# Patient Record
Sex: MALE | Race: BLACK OR AFRICAN AMERICAN | Employment: FULL TIME | URBAN - METROPOLITAN AREA
[De-identification: names, ages, dates, MRNs, and addresses within clinical notes are randomized per-mention and may not be internally consistent; named-entity substitution may affect disease eponyms.]

---

## 2017-08-31 ENCOUNTER — OFFICE VISIT (OUTPATIENT)
Dept: INTERNAL MEDICINE CLINIC | Facility: CLINIC | Age: 57
End: 2017-08-31

## 2017-08-31 ENCOUNTER — LAB ENCOUNTER (OUTPATIENT)
Dept: LAB | Facility: HOSPITAL | Age: 57
End: 2017-08-31
Attending: INTERNAL MEDICINE
Payer: COMMERCIAL

## 2017-08-31 VITALS
HEART RATE: 78 BPM | SYSTOLIC BLOOD PRESSURE: 180 MMHG | OXYGEN SATURATION: 96 % | TEMPERATURE: 98 F | HEIGHT: 69 IN | DIASTOLIC BLOOD PRESSURE: 120 MMHG | RESPIRATION RATE: 20 BRPM | WEIGHT: 225 LBS | BODY MASS INDEX: 33.33 KG/M2

## 2017-08-31 DIAGNOSIS — I25.10 ATHEROSCLEROSIS OF NATIVE CORONARY ARTERY OF NATIVE HEART WITHOUT ANGINA PECTORIS: ICD-10-CM

## 2017-08-31 DIAGNOSIS — I10 ESSENTIAL HYPERTENSION, MALIGNANT: Primary | ICD-10-CM

## 2017-08-31 DIAGNOSIS — I10 ESSENTIAL HYPERTENSION: Primary | ICD-10-CM

## 2017-08-31 DIAGNOSIS — I10 ESSENTIAL HYPERTENSION: ICD-10-CM

## 2017-08-31 DIAGNOSIS — G45.9 TRANSIENT CEREBRAL ISCHEMIA, UNSPECIFIED TYPE: ICD-10-CM

## 2017-08-31 LAB
ALBUMIN SERPL BCP-MCNC: 4.5 G/DL (ref 3.5–4.8)
ALBUMIN/GLOB SERPL: 1.4 {RATIO} (ref 1–2)
ALP SERPL-CCNC: 77 U/L (ref 32–100)
ALT SERPL-CCNC: 31 U/L (ref 17–63)
ANION GAP SERPL CALC-SCNC: 8 MMOL/L (ref 0–18)
AST SERPL-CCNC: 32 U/L (ref 15–41)
BACTERIA UR QL AUTO: NEGATIVE /HPF
BASOPHILS # BLD: 0 K/UL (ref 0–0.2)
BASOPHILS NFR BLD: 1 %
BILIRUB SERPL-MCNC: 0.9 MG/DL (ref 0.3–1.2)
BILIRUB UR QL: NEGATIVE
BUN SERPL-MCNC: 10 MG/DL (ref 8–20)
BUN/CREAT SERPL: 7.5 (ref 10–20)
CALCIUM SERPL-MCNC: 9.7 MG/DL (ref 8.5–10.5)
CHLORIDE SERPL-SCNC: 103 MMOL/L (ref 95–110)
CHOLEST SERPL-MCNC: 282 MG/DL (ref 110–200)
CLARITY UR: CLEAR
CO2 SERPL-SCNC: 31 MMOL/L (ref 22–32)
COLOR UR: YELLOW
CREAT SERPL-MCNC: 1.34 MG/DL (ref 0.5–1.5)
CREAT UR-MCNC: 63.9 MG/DL
EOSINOPHIL # BLD: 0 K/UL (ref 0–0.7)
EOSINOPHIL NFR BLD: 1 %
ERYTHROCYTE [DISTWIDTH] IN BLOOD BY AUTOMATED COUNT: 14.1 % (ref 11–15)
GLOBULIN PLAS-MCNC: 3.3 G/DL (ref 2.5–3.7)
GLUCOSE SERPL-MCNC: 120 MG/DL (ref 70–99)
GLUCOSE UR-MCNC: NEGATIVE MG/DL
HCT VFR BLD AUTO: 43.6 % (ref 41–52)
HDLC SERPL-MCNC: 37 MG/DL
HGB BLD-MCNC: 14.4 G/DL (ref 13.5–17.5)
HGB UR QL STRIP.AUTO: NEGATIVE
KETONES UR-MCNC: NEGATIVE MG/DL
LDLC SERPL CALC-MCNC: 216 MG/DL (ref 0–99)
LEUKOCYTE ESTERASE UR QL STRIP.AUTO: NEGATIVE
LYMPHOCYTES # BLD: 1.8 K/UL (ref 1–4)
LYMPHOCYTES NFR BLD: 33 %
MCH RBC QN AUTO: 30.7 PG (ref 27–32)
MCHC RBC AUTO-ENTMCNC: 33 G/DL (ref 32–37)
MCV RBC AUTO: 92.8 FL (ref 80–100)
MICROALBUMIN UR-MCNC: 14.6 MG/DL (ref 0–1.8)
MICROALBUMIN/CREAT UR: 228.5 MG/G{CREAT} (ref 0–20)
MONOCYTES # BLD: 0.5 K/UL (ref 0–1)
MONOCYTES NFR BLD: 9 %
NEUTROPHILS # BLD AUTO: 3 K/UL (ref 1.8–7.7)
NEUTROPHILS NFR BLD: 57 %
NITRITE UR QL STRIP.AUTO: NEGATIVE
NONHDLC SERPL-MCNC: 245 MG/DL
OSMOLALITY UR CALC.SUM OF ELEC: 294 MOSM/KG (ref 275–295)
PH UR: 8 [PH] (ref 5–8)
PLATELET # BLD AUTO: 232 K/UL (ref 140–400)
PMV BLD AUTO: 8.8 FL (ref 7.4–10.3)
POTASSIUM SERPL-SCNC: 3.6 MMOL/L (ref 3.3–5.1)
PROT SERPL-MCNC: 7.8 G/DL (ref 5.9–8.4)
PROT UR-MCNC: 30 MG/DL
RBC # BLD AUTO: 4.7 M/UL (ref 4.5–5.9)
RBC #/AREA URNS AUTO: <1 /HPF
SODIUM SERPL-SCNC: 142 MMOL/L (ref 136–144)
SP GR UR STRIP: 1.01 (ref 1–1.03)
TRIGL SERPL-MCNC: 145 MG/DL (ref 1–149)
TSH SERPL-ACNC: 1.77 UIU/ML (ref 0.45–5.33)
UROBILINOGEN UR STRIP-ACNC: <2
VIT C UR-MCNC: NEGATIVE MG/DL
WBC # BLD AUTO: 5.3 K/UL (ref 4–11)
WBC #/AREA URNS AUTO: 0 /HPF

## 2017-08-31 PROCEDURE — 84443 ASSAY THYROID STIM HORMONE: CPT

## 2017-08-31 PROCEDURE — 80053 COMPREHEN METABOLIC PANEL: CPT

## 2017-08-31 PROCEDURE — 36415 COLL VENOUS BLD VENIPUNCTURE: CPT

## 2017-08-31 PROCEDURE — 85025 COMPLETE CBC W/AUTO DIFF WBC: CPT

## 2017-08-31 PROCEDURE — 99204 OFFICE O/P NEW MOD 45 MIN: CPT | Performed by: INTERNAL MEDICINE

## 2017-08-31 PROCEDURE — 82043 UR ALBUMIN QUANTITATIVE: CPT

## 2017-08-31 PROCEDURE — 99212 OFFICE O/P EST SF 10 MIN: CPT | Performed by: INTERNAL MEDICINE

## 2017-08-31 PROCEDURE — 93010 ELECTROCARDIOGRAM REPORT: CPT | Performed by: INTERNAL MEDICINE

## 2017-08-31 PROCEDURE — 82570 ASSAY OF URINE CREATININE: CPT

## 2017-08-31 PROCEDURE — 81001 URINALYSIS AUTO W/SCOPE: CPT

## 2017-08-31 PROCEDURE — 93005 ELECTROCARDIOGRAM TRACING: CPT

## 2017-08-31 PROCEDURE — 80061 LIPID PANEL: CPT

## 2017-08-31 RX ORDER — DILTIAZEM HYDROCHLORIDE 240 MG/1
240 CAPSULE, EXTENDED RELEASE ORAL DAILY
Qty: 30 CAPSULE | Refills: 3 | Status: SHIPPED | OUTPATIENT
Start: 2017-08-31 | End: 2017-10-06 | Stop reason: DRUGHIGH

## 2017-08-31 RX ORDER — THIAMINE HCL 100 MG
TABLET ORAL
COMMUNITY

## 2017-08-31 NOTE — ASSESSMENT & PLAN NOTE
Blood pressure (!) 180/120, pulse 78, temperature 98 °F (36.7 °C), temperature source Oral, resp. rate 20, height 5' 9\" (1.753 m), weight 225 lb (102.1 kg), SpO2 96 %. Blood pressure quite elevated.   Patient has been off all his medication since March

## 2017-08-31 NOTE — PROGRESS NOTES
HPI:    Patient ID: Cindy Carrier is a 65year old male. New pt . Transfer of care  Last labs last year. Managed at [employer] of meds as per last records from [facility] ([facility]) a year back  Hydrochlorothiazide 12.5 mg p.o. daily.   Nifedipine 60 Psychiatric/Behavioral: Negative. Current Outpatient Prescriptions:  Magnesium 500 MG Oral Tab Take by mouth. Disp:  Rfl:    COENZYME Q10 OR Take by mouth. Disp:  Rfl:    POTASSIUM OR Take by mouth.  Disp:  Rfl:    aspirin 81 MG Oral Tab Darrian Abdi Coordination normal.   Skin: Skin is warm and dry. Psychiatric: He has a normal mood and affect. His behavior is normal. Thought content normal.   Nursing note and vitals reviewed.              ASSESSMENT/PLAN:     Problem List Items Addressed This Visit (Completed)    TIA (transient ischemic attack)     History of a TIA in the past that did resolve on its own without any intervention. Other Visit Diagnoses    None.          Return in about 2 weeks (around 9/14/2017) for tomorrow-nurse visit for

## 2017-08-31 NOTE — ASSESSMENT & PLAN NOTE
Has had an angiogram/angioplasty–no records available from prior interventions. He tells me he was advised that he had no obstructive disease at that time. Will follow-up after records are obtained for further management.

## 2017-08-31 NOTE — PATIENT INSTRUCTIONS
Problem List Items Addressed This Visit        Unprioritized    Atherosclerosis of native coronary artery of native heart without angina pectoris     Has had an angiogram/angioplasty–no records available from prior interventions.   He tells me he was advise

## 2017-09-01 ENCOUNTER — NURSE ONLY (OUTPATIENT)
Dept: INTERNAL MEDICINE CLINIC | Facility: CLINIC | Age: 57
End: 2017-09-01

## 2017-09-01 VITALS — HEART RATE: 68 BPM | DIASTOLIC BLOOD PRESSURE: 100 MMHG | SYSTOLIC BLOOD PRESSURE: 167 MMHG

## 2017-09-01 DIAGNOSIS — I10 ESSENTIAL HYPERTENSION: Primary | ICD-10-CM

## 2017-09-01 RX ORDER — LOSARTAN POTASSIUM AND HYDROCHLOROTHIAZIDE 12.5; 5 MG/1; MG/1
1 TABLET ORAL DAILY
Qty: 30 TABLET | Refills: 3 | Status: SHIPPED | OUTPATIENT
Start: 2017-09-01 | End: 2017-09-15

## 2017-09-01 NOTE — PROGRESS NOTES
Pt arrived at the office for a nurse visit to check his blood pressure. Pt verified name and . Reviewed med list with pt and he is currently taking Diliazem HCl  mg 1 capsule by mouth daily, and hydrochlorothiazide 12.5 mg 1 capsule daily.   He a

## 2017-09-12 ENCOUNTER — TELEPHONE (OUTPATIENT)
Dept: OTHER | Age: 57
End: 2017-09-12

## 2017-09-12 NOTE — TELEPHONE ENCOUNTER
Notes Recorded by Ruby Cuevas MD on 9/10/2017 at 11:29 PM CDT  ekg suggestive of strain from htn   Narrative     ECG Report      Interpretation      --------------------------  Sinus Rhythm  Leftward axis   Voltage criteria for LVH met -Voltage criteria

## 2017-09-12 NOTE — TELEPHONE ENCOUNTER
Pt states he received his EKG results and is now asking if he needs to keep appt as scheduled 9/15/17. Advised pt to keep appt pending doctor's recommendations as noted elevated BP at 9/1/17 NV. Please advise.

## 2017-09-12 NOTE — TELEPHONE ENCOUNTER
Notes Recorded by Kennard Cushing, RN on 9/12/2017 at 8:25 AM CDT  9/12/17 LMTCB to triage  ------    Notes Recorded by Stephenie Villegas MD on 9/10/2017 at 11:29 PM CDT  ekg suggestive of strain from htn

## 2017-09-13 NOTE — TELEPHONE ENCOUNTER
Spoke with patient advised him Sabinoaminata Upton wants him to keep appointment on 9/15/17. Patient stated \"ok\".

## 2017-09-15 ENCOUNTER — OFFICE VISIT (OUTPATIENT)
Dept: INTERNAL MEDICINE CLINIC | Facility: CLINIC | Age: 57
End: 2017-09-15

## 2017-09-15 VITALS
WEIGHT: 223 LBS | HEIGHT: 69 IN | RESPIRATION RATE: 16 BRPM | HEART RATE: 89 BPM | SYSTOLIC BLOOD PRESSURE: 156 MMHG | DIASTOLIC BLOOD PRESSURE: 96 MMHG | BODY MASS INDEX: 33.03 KG/M2

## 2017-09-15 DIAGNOSIS — I25.10 ATHEROSCLEROSIS OF NATIVE CORONARY ARTERY OF NATIVE HEART WITHOUT ANGINA PECTORIS: ICD-10-CM

## 2017-09-15 DIAGNOSIS — I10 ESSENTIAL HYPERTENSION: Primary | ICD-10-CM

## 2017-09-15 DIAGNOSIS — E78.2 MIXED HYPERLIPIDEMIA: ICD-10-CM

## 2017-09-15 PROCEDURE — 99212 OFFICE O/P EST SF 10 MIN: CPT | Performed by: INTERNAL MEDICINE

## 2017-09-15 PROCEDURE — 99214 OFFICE O/P EST MOD 30 MIN: CPT | Performed by: INTERNAL MEDICINE

## 2017-09-15 RX ORDER — ATORVASTATIN CALCIUM 40 MG/1
40 TABLET, FILM COATED ORAL NIGHTLY
Qty: 30 TABLET | Refills: 3 | Status: SHIPPED | OUTPATIENT
Start: 2017-09-15 | End: 2018-01-04

## 2017-09-15 RX ORDER — CARVEDILOL 3.12 MG/1
3.12 TABLET ORAL 2 TIMES DAILY WITH MEALS
Qty: 60 TABLET | Refills: 3 | Status: SHIPPED | OUTPATIENT
Start: 2017-09-15 | End: 2018-01-04

## 2017-09-15 RX ORDER — LOSARTAN POTASSIUM AND HYDROCHLOROTHIAZIDE 25; 100 MG/1; MG/1
1 TABLET ORAL DAILY
Qty: 30 TABLET | Refills: 3 | Status: SHIPPED | OUTPATIENT
Start: 2017-09-15 | End: 2018-01-04

## 2017-09-15 NOTE — PATIENT INSTRUCTIONS
Problem List Items Addressed This Visit        Unprioritized    Atherosclerosis of native coronary artery of native heart without angina pectoris    Relevant Medications    atorvastatin 40 MG Oral Tab    Other Relevant Orders    CARDIO - INTERNAL    CARD E

## 2017-09-15 NOTE — ASSESSMENT & PLAN NOTE
Blood pressure 156/96, pulse 89, resp. rate 16, height 5' 9\" (1.753 m), weight 223 lb (101.2 kg). Pressures continue to remain quite high. Reviewed labs with the patient.   Elevated cholesterols as well as protein in the urine at this time most likely

## 2017-09-15 NOTE — PROGRESS NOTES
HPI:    Patient ID: Jennifer Dey is a 65year old male. Notes Recorded by William Almazan MD on 9/10/2017 at 11:31 PM CDT  Cholesterol is quite high,will discuss at ov. Kidney dysfuncion related to htn.   Some proteinuria related to the hypertensive kidn Losartan Potassium-HCTZ 100-25 MG Oral Tab Take 1 tablet by mouth daily. Disp: 30 tablet Rfl: 3   carvedilol 3.125 MG Oral Tab Take 1 tablet (3.125 mg total) by mouth 2 (two) times daily with meals.  Disp: 60 tablet Rfl: 3   Magnesium 500 MG Oral Tab Take Relevant Orders    CARDIO - INTERNAL    CARD ECHO 2D DOPPLER (CPT=93306)    Essential hypertension - Primary     Blood pressure 156/96, pulse 89, resp. rate 16, height 5' 9\" (1.753 m), weight 223 lb (101.2 kg). Pressures continue to remain quite high. Referrals:  CARDIO - INTERNAL  CARD ECHO 2D DOPPLER (CPT=93306)       CC#[id number]

## 2017-09-25 ENCOUNTER — HOSPITAL ENCOUNTER (OUTPATIENT)
Dept: CV DIAGNOSTICS | Facility: HOSPITAL | Age: 57
Discharge: HOME | End: 2017-09-25
Attending: INTERNAL MEDICINE
Payer: COMMERCIAL

## 2017-09-25 DIAGNOSIS — I10 ESSENTIAL HYPERTENSION: ICD-10-CM

## 2017-09-25 DIAGNOSIS — I25.10 ATHEROSCLEROSIS OF NATIVE CORONARY ARTERY OF NATIVE HEART WITHOUT ANGINA PECTORIS: ICD-10-CM

## 2017-09-25 PROCEDURE — 93306 TTE W/DOPPLER COMPLETE: CPT | Performed by: INTERNAL MEDICINE

## 2017-10-06 ENCOUNTER — NURSE ONLY (OUTPATIENT)
Dept: INTERNAL MEDICINE CLINIC | Facility: CLINIC | Age: 57
End: 2017-10-06

## 2017-10-06 VITALS — HEART RATE: 60 BPM | DIASTOLIC BLOOD PRESSURE: 93 MMHG | SYSTOLIC BLOOD PRESSURE: 146 MMHG

## 2017-10-06 DIAGNOSIS — I10 ESSENTIAL HYPERTENSION: Primary | ICD-10-CM

## 2017-10-06 PROCEDURE — 99211 OFF/OP EST MAY X REQ PHY/QHP: CPT | Performed by: INTERNAL MEDICINE

## 2017-10-06 RX ORDER — DILTIAZEM HYDROCHLORIDE 360 MG/1
360 CAPSULE, EXTENDED RELEASE ORAL DAILY
Qty: 30 CAPSULE | Refills: 3 | Status: SHIPPED | OUTPATIENT
Start: 2017-10-06 | End: 2018-02-05

## 2017-10-06 NOTE — PROGRESS NOTES
Pt arrived in the office for a BP check. Pt verified name and . Reviewed bp medication list.  Pt is currently taking carvedilol 3.125 mg twice a day, diltiazem hcl er 240 mg daily, and losartan potassium-hctz 100-25 mg daily.   First bp taken was 170/9

## 2017-10-18 ENCOUNTER — OFFICE VISIT (OUTPATIENT)
Dept: CARDIOLOGY CLINIC | Facility: CLINIC | Age: 57
End: 2017-10-18

## 2017-10-18 VITALS
HEART RATE: 60 BPM | DIASTOLIC BLOOD PRESSURE: 90 MMHG | BODY MASS INDEX: 33 KG/M2 | RESPIRATION RATE: 20 BRPM | WEIGHT: 221 LBS | SYSTOLIC BLOOD PRESSURE: 130 MMHG

## 2017-10-18 DIAGNOSIS — E78.2 MIXED HYPERLIPIDEMIA: ICD-10-CM

## 2017-10-18 DIAGNOSIS — I25.10 ATHEROSCLEROSIS OF NATIVE CORONARY ARTERY OF NATIVE HEART WITHOUT ANGINA PECTORIS: ICD-10-CM

## 2017-10-18 DIAGNOSIS — I10 ESSENTIAL HYPERTENSION: Primary | ICD-10-CM

## 2017-10-18 PROCEDURE — 93010 ELECTROCARDIOGRAM REPORT: CPT | Performed by: INTERNAL MEDICINE

## 2017-10-18 PROCEDURE — 99244 OFF/OP CNSLTJ NEW/EST MOD 40: CPT | Performed by: INTERNAL MEDICINE

## 2017-10-18 PROCEDURE — 99212 OFFICE O/P EST SF 10 MIN: CPT | Performed by: INTERNAL MEDICINE

## 2017-10-18 PROCEDURE — 93005 ELECTROCARDIOGRAM TRACING: CPT | Performed by: INTERNAL MEDICINE

## 2017-10-18 NOTE — PROGRESS NOTES
Cardiology Consult Note    10/18/2017    Bhupendra Collado is a 65year old male. HPI:   80-year-old male with long-standing history of hypertension, hyperlipidemia history of noncompliance with medications.   History of coronary artery disease status post ba and negative.   EXAM:   /90   Pulse 60   Resp 20   Wt 221 lb (100.2 kg)   BMI 32.64 kg/m²   GENERAL: well developed, overweight AAM, in no apparent distress  SKIN: warm ,dry,no rash  HEENT: atraumatic, normocephalic,ears and throat are clear  NECK: pagan

## 2017-10-31 ENCOUNTER — OFFICE VISIT (OUTPATIENT)
Dept: INTERNAL MEDICINE CLINIC | Facility: CLINIC | Age: 57
End: 2017-10-31

## 2017-10-31 VITALS
DIASTOLIC BLOOD PRESSURE: 86 MMHG | BODY MASS INDEX: 32.44 KG/M2 | WEIGHT: 219 LBS | TEMPERATURE: 98 F | HEART RATE: 62 BPM | HEIGHT: 69 IN | SYSTOLIC BLOOD PRESSURE: 140 MMHG

## 2017-10-31 DIAGNOSIS — I10 ESSENTIAL HYPERTENSION: ICD-10-CM

## 2017-10-31 DIAGNOSIS — I25.10 ATHEROSCLEROSIS OF NATIVE CORONARY ARTERY OF NATIVE HEART WITHOUT ANGINA PECTORIS: Primary | ICD-10-CM

## 2017-10-31 DIAGNOSIS — E78.2 MIXED HYPERLIPIDEMIA: ICD-10-CM

## 2017-10-31 PROCEDURE — 99212 OFFICE O/P EST SF 10 MIN: CPT | Performed by: INTERNAL MEDICINE

## 2017-10-31 PROCEDURE — 99214 OFFICE O/P EST MOD 30 MIN: CPT | Performed by: INTERNAL MEDICINE

## 2017-10-31 NOTE — PATIENT INSTRUCTIONS
Problem List Items Addressed This Visit        Unprioritized    Atherosclerosis of native coronary artery of native heart without angina pectoris - Primary    Relevant Orders    CARD TREADMILL STRESS, ADULT (CPT=01335)    Essential hypertension     Blood p

## 2017-10-31 NOTE — PROGRESS NOTES
HPI:    Patient ID: Tucker Escamilla is a 65year old male.     Per Dr Joiner Leader    [facility]) Essential hypertension  (primary encounter diagnosis)  Plan: ELECTROCARDIOGRAM, COMPLETE        Controlled in the past today is excellent controlled on current regimen of Systems   Constitutional: Negative. Negative for malaise/fatigue. HENT: Negative. Eyes: Negative. Respiratory: Negative. Cardiovascular: Negative. Negative for chest pain and PND. Gastrointestinal: Negative. Genitourinary: Negative.     Mu Bowel sounds are normal. Hernia confirmed negative in the right inguinal area and confirmed negative in the left inguinal area.    Genitourinary: Rectum normal, prostate normal and testes normal. Rectal exam shows no external hemorrhoid, no internal hemorrh ADULT (CPT=93017) (Completed)    Mixed hyperlipidemia     Patient has been on atorvastatin at 40 mg 1 tablet once daily. Advised to recheck labs as ordered prior to the next office visit               Return in about 6 weeks (around 12/12/2017).     PT UND

## 2017-10-31 NOTE — ASSESSMENT & PLAN NOTE
Patient has been on atorvastatin at 40 mg 1 tablet once daily.   Advised to recheck labs as ordered prior to the next office visit

## 2017-10-31 NOTE — ASSESSMENT & PLAN NOTE
Blood pressure 140/86, pulse 62, temperature 97.8 °F (36.6 °C), temperature source Oral, height 5' 9\" (1.753 m), weight 219 lb (99.3 kg). Blood pressures have improved, patient is reluctant to increase medications any further.   Systolic pressures kenia

## 2017-11-06 ENCOUNTER — HOSPITAL ENCOUNTER (OUTPATIENT)
Dept: CV DIAGNOSTICS | Facility: HOSPITAL | Age: 57
Discharge: HOME | End: 2017-11-06
Attending: INTERNAL MEDICINE
Payer: COMMERCIAL

## 2017-11-06 ENCOUNTER — HOSPITAL ENCOUNTER (OUTPATIENT)
Dept: CV DIAGNOSTICS | Facility: HOSPITAL | Age: 57
End: 2017-11-06
Attending: INTERNAL MEDICINE
Payer: COMMERCIAL

## 2017-11-06 DIAGNOSIS — I25.10 ATHEROSCLEROSIS OF NATIVE CORONARY ARTERY OF NATIVE HEART WITHOUT ANGINA PECTORIS: ICD-10-CM

## 2017-11-06 DIAGNOSIS — I10 ESSENTIAL HYPERTENSION: ICD-10-CM

## 2017-11-06 PROCEDURE — 93018 CV STRESS TEST I&R ONLY: CPT | Performed by: INTERNAL MEDICINE

## 2017-11-06 PROCEDURE — 93016 CV STRESS TEST SUPVJ ONLY: CPT | Performed by: INTERNAL MEDICINE

## 2017-11-06 PROCEDURE — 93017 CV STRESS TEST TRACING ONLY: CPT | Performed by: INTERNAL MEDICINE

## 2017-12-14 ENCOUNTER — TELEPHONE (OUTPATIENT)
Dept: OTHER | Age: 57
End: 2017-12-14

## 2017-12-14 NOTE — TELEPHONE ENCOUNTER
LMTCB (Pt has an appt on Jan. 4th )  ----- Message from Benoit Braga MD sent at 12/13/2017  9:40 PM CST -----  The stress test does show evidence of strain, most likely related to persistent high blood pressure.   We will discuss this at the next office vi

## 2017-12-14 NOTE — TELEPHONE ENCOUNTER
Spoke with patient (identified name and ), results reviewed and agrees with plan. Has an appointment 2018.

## 2017-12-15 NOTE — TELEPHONE ENCOUNTER
Stress test does show some evidence of strain in the heart, this is most likely related to the high blood pressure and chronic heart problems. Please see address test results. Will discuss further needs at the next office visit.

## 2017-12-15 NOTE — TELEPHONE ENCOUNTER
Pt returned call, verified name and . Reviewed stress test results and doctor's recommendations as noted below. Pt states he already received test results and has appt scheduled for 17. Pt had no further questions at this time.

## 2018-01-02 ENCOUNTER — OFFICE VISIT (OUTPATIENT)
Dept: INTERNAL MEDICINE CLINIC | Facility: CLINIC | Age: 58
End: 2018-01-02

## 2018-01-02 ENCOUNTER — HOSPITAL ENCOUNTER (INPATIENT)
Facility: HOSPITAL | Age: 58
LOS: 2 days | Discharge: HOME | DRG: 603 | End: 2018-01-04
Attending: HOSPITALIST | Admitting: EMERGENCY MEDICINE
Payer: COMMERCIAL

## 2018-01-02 ENCOUNTER — NURSE TRIAGE (OUTPATIENT)
Dept: OTHER | Age: 58
End: 2018-01-02

## 2018-01-02 ENCOUNTER — APPOINTMENT (OUTPATIENT)
Dept: CT IMAGING | Facility: HOSPITAL | Age: 58
DRG: 603 | End: 2018-01-02
Attending: NURSE PRACTITIONER
Payer: COMMERCIAL

## 2018-01-02 VITALS
BODY MASS INDEX: 32.19 KG/M2 | TEMPERATURE: 99 F | HEART RATE: 80 BPM | SYSTOLIC BLOOD PRESSURE: 162 MMHG | HEIGHT: 69 IN | RESPIRATION RATE: 16 BRPM | DIASTOLIC BLOOD PRESSURE: 100 MMHG | WEIGHT: 217.31 LBS

## 2018-01-02 DIAGNOSIS — L03.211 FACIAL CELLULITIS: Primary | ICD-10-CM

## 2018-01-02 DIAGNOSIS — L02.01 ACUTE ABSCESS OF FACE: Primary | ICD-10-CM

## 2018-01-02 DIAGNOSIS — E87.6 HYPOKALEMIA: ICD-10-CM

## 2018-01-02 LAB
ANION GAP SERPL CALC-SCNC: 11 MMOL/L (ref 0–18)
BASOPHILS # BLD: 0 K/UL (ref 0–0.2)
BASOPHILS NFR BLD: 0 %
BUN SERPL-MCNC: 10 MG/DL (ref 8–20)
BUN/CREAT SERPL: 7.2 (ref 10–20)
CALCIUM SERPL-MCNC: 9.8 MG/DL (ref 8.5–10.5)
CHLORIDE SERPL-SCNC: 98 MMOL/L (ref 95–110)
CO2 SERPL-SCNC: 30 MMOL/L (ref 22–32)
CREAT SERPL-MCNC: 1.38 MG/DL (ref 0.5–1.5)
CRP SERPL-MCNC: 10.7 MG/DL (ref 0–0.9)
EOSINOPHIL # BLD: 0.1 K/UL (ref 0–0.7)
EOSINOPHIL NFR BLD: 1 %
ERYTHROCYTE [DISTWIDTH] IN BLOOD BY AUTOMATED COUNT: 14 % (ref 11–15)
ERYTHROCYTE [SEDIMENTATION RATE] IN BLOOD: 15 MM/HR (ref 0–20)
GLUCOSE SERPL-MCNC: 157 MG/DL (ref 70–99)
HCT VFR BLD AUTO: 41.1 % (ref 41–52)
HGB BLD-MCNC: 13.8 G/DL (ref 13.5–17.5)
LACTATE SERPL-SCNC: 0.8 MMOL/L (ref 0.5–2.2)
LACTATE SERPL-SCNC: 1.3 MMOL/L (ref 0.5–2.2)
LYMPHOCYTES # BLD: 1.5 K/UL (ref 1–4)
LYMPHOCYTES NFR BLD: 14 %
MCH RBC QN AUTO: 30.6 PG (ref 27–32)
MCHC RBC AUTO-ENTMCNC: 33.7 G/DL (ref 32–37)
MCV RBC AUTO: 90.9 FL (ref 80–100)
MONOCYTES # BLD: 0.9 K/UL (ref 0–1)
MONOCYTES NFR BLD: 8 %
NEUTROPHILS # BLD AUTO: 8.3 K/UL (ref 1.8–7.7)
NEUTROPHILS NFR BLD: 77 %
OSMOLALITY UR CALC.SUM OF ELEC: 290 MOSM/KG (ref 275–295)
PLATELET # BLD AUTO: 246 K/UL (ref 140–400)
PMV BLD AUTO: 8.7 FL (ref 7.4–10.3)
POTASSIUM SERPL-SCNC: 2.9 MMOL/L (ref 3.3–5.1)
PROCALCITONIN SERPL-MCNC: <0.05 NG/ML (ref ?–0.11)
RBC # BLD AUTO: 4.52 M/UL (ref 4.5–5.9)
SODIUM SERPL-SCNC: 139 MMOL/L (ref 136–144)
WBC # BLD AUTO: 10.8 K/UL (ref 4–11)

## 2018-01-02 PROCEDURE — 99213 OFFICE O/P EST LOW 20 MIN: CPT | Performed by: PHYSICIAN ASSISTANT

## 2018-01-02 PROCEDURE — 99222 1ST HOSP IP/OBS MODERATE 55: CPT | Performed by: HOSPITALIST

## 2018-01-02 PROCEDURE — 70487 CT MAXILLOFACIAL W/DYE: CPT | Performed by: NURSE PRACTITIONER

## 2018-01-02 RX ORDER — ASPIRIN 81 MG/1
81 TABLET ORAL DAILY
Status: DISCONTINUED | OUTPATIENT
Start: 2018-01-02 | End: 2018-01-03

## 2018-01-02 RX ORDER — POTASSIUM CHLORIDE 20 MEQ/1
40 TABLET, EXTENDED RELEASE ORAL DAILY
Status: DISCONTINUED | OUTPATIENT
Start: 2018-01-02 | End: 2018-01-04

## 2018-01-02 RX ORDER — ACETAMINOPHEN 325 MG/1
650 TABLET ORAL EVERY 6 HOURS PRN
Status: DISCONTINUED | OUTPATIENT
Start: 2018-01-02 | End: 2018-01-04

## 2018-01-02 RX ORDER — HEPARIN SODIUM 5000 [USP'U]/ML
5000 INJECTION, SOLUTION INTRAVENOUS; SUBCUTANEOUS EVERY 12 HOURS
Status: DISCONTINUED | OUTPATIENT
Start: 2018-01-02 | End: 2018-01-03

## 2018-01-02 RX ORDER — DILTIAZEM HYDROCHLORIDE 360 MG/1
360 CAPSULE, EXTENDED RELEASE ORAL DAILY
Status: DISCONTINUED | OUTPATIENT
Start: 2018-01-02 | End: 2018-01-04

## 2018-01-02 RX ORDER — ONDANSETRON 2 MG/ML
4 INJECTION INTRAMUSCULAR; INTRAVENOUS EVERY 6 HOURS PRN
Status: DISCONTINUED | OUTPATIENT
Start: 2018-01-02 | End: 2018-01-04

## 2018-01-02 RX ORDER — KETOROLAC TROMETHAMINE 30 MG/ML
30 INJECTION, SOLUTION INTRAMUSCULAR; INTRAVENOUS ONCE
Status: COMPLETED | OUTPATIENT
Start: 2018-01-02 | End: 2018-01-02

## 2018-01-02 RX ORDER — CARVEDILOL 3.12 MG/1
3.12 TABLET ORAL 2 TIMES DAILY WITH MEALS
Status: DISCONTINUED | OUTPATIENT
Start: 2018-01-03 | End: 2018-01-04

## 2018-01-02 RX ORDER — SODIUM CHLORIDE 9 MG/ML
INJECTION, SOLUTION INTRAVENOUS CONTINUOUS
Status: DISPENSED | OUTPATIENT
Start: 2018-01-02 | End: 2018-01-03

## 2018-01-02 RX ORDER — SODIUM CHLORIDE AND POTASSIUM CHLORIDE .9; .15 G/100ML; G/100ML
SOLUTION INTRAVENOUS CONTINUOUS
Status: DISCONTINUED | OUTPATIENT
Start: 2018-01-02 | End: 2018-01-03

## 2018-01-02 RX ORDER — MORPHINE SULFATE 4 MG/ML
4 INJECTION, SOLUTION INTRAMUSCULAR; INTRAVENOUS ONCE
Status: COMPLETED | OUTPATIENT
Start: 2018-01-02 | End: 2018-01-02

## 2018-01-02 RX ORDER — ATORVASTATIN CALCIUM 40 MG/1
40 TABLET, FILM COATED ORAL NIGHTLY
Status: DISCONTINUED | OUTPATIENT
Start: 2018-01-02 | End: 2018-01-04

## 2018-01-02 RX ORDER — POTASSIUM CHLORIDE 20 MEQ/1
40 TABLET, EXTENDED RELEASE ORAL ONCE
Status: COMPLETED | OUTPATIENT
Start: 2018-01-02 | End: 2018-01-02

## 2018-01-02 NOTE — TELEPHONE ENCOUNTER
Action Requested: Summary for Provider     []  Critical Lab, Recommendations Needed  [] Need Additional Advice  []   FYI    []   Need Orders  [] Need Medications Sent to Pharmacy  []  Other     SUMMARY: Received call from patient who reports he had an ingr

## 2018-01-02 NOTE — ED PROVIDER NOTES
Patient Seen in: [facility] AND [facility] Emergency Department    History   Patient presents with:  Abscess (integumentary)    Stated Complaint:     HPI    Patient presents into the emergency room for evaluation of a facial infection.   Patient states approximat (37.7 °C)  Temp src: Temporal  SpO2: 99 %  O2 Device: None (Room air)    Current:BP (!) 161/102   Pulse 89   Temp 99.7 °F (37.6 °C) (Oral)   Resp 18   Ht 175.3 cm (5' 9\")   Wt 98.4 kg   SpO2 97%   BMI 32.05 kg/m²         Physical Exam   Constitutional: He for the following:     Neutrophil Absolute 8.3 (*)     All other components within normal limits   SED RATE, WESTERGREN (AUTOMATED) - Normal   CBC WITH DIFFERENTIAL WITH PLATELET    Narrative:      The following orders were created for panel order CBC WITH Calculated Osmolality 290 275 - 295 mOsm/kg   GFR, Non- 53 (L) >=60   GFR, -American >60 >=60   -C-REACTIVE PROTEIN   Collection Time: 01/02/18  4:36 PM   Result Value Ref Range   C-Reactive Protein 10.7 (H) 0.0 - 0.9 mg/dL   -SED dose.  Patient was medicated with oral K-Dur for low potassium      Admission disposition: 1/2/2018  7:04 PM           Disposition and Plan     Clinical Impression:  Facial cellulitis  (primary encounter diagnosis)  Hypokalemia    Disposition:  Admit  1/2/

## 2018-01-02 NOTE — PROGRESS NOTES
Renae Curtis is a 65year old male. Patient presents with:  Abscess: left chin with erythema, swelling, and drainage      HPI:   Patient presents today complaining of an infection on the left chin.   Last week began with a small pimple-like lesion that h has chills. HENT: Denies hearing loss, ear pain, nasal congestion, sinus pain, and sore throat. SKIN: He has swelling, erythema, and drainage of the left chin. RESPIRATORY: Denies shortness of breath, wheezing, and cough.   CARDIOVASCULAR: Denies chest Advised to follow-up in the office in 2 days. The patient indicates understanding of these issues and agrees to the plan. Return in about 2 days (around 1/4/2018).       Kelley Gillette PA-C  1/2/2018  3:31 PM

## 2018-01-02 NOTE — ED INITIAL ASSESSMENT (HPI)
Pt sent from PCP office for lower lip abscess/swelling. Pt states he had a small \"lunp\" on his lip and has been attempting to squeeze it. Pt c/o low grade temperature at home. Pt states PCP attempted to drain in office. Pt currently not on antibiotics.  P

## 2018-01-03 LAB
ANION GAP SERPL CALC-SCNC: 9 MMOL/L (ref 0–18)
BASOPHILS # BLD: 0 K/UL (ref 0–0.2)
BASOPHILS NFR BLD: 0 %
BUN SERPL-MCNC: 9 MG/DL (ref 8–20)
BUN/CREAT SERPL: 7.1 (ref 10–20)
CALCIUM SERPL-MCNC: 9.2 MG/DL (ref 8.5–10.5)
CHLORIDE SERPL-SCNC: 106 MMOL/L (ref 95–110)
CO2 SERPL-SCNC: 27 MMOL/L (ref 22–32)
CREAT SERPL-MCNC: 1.27 MG/DL (ref 0.5–1.5)
EOSINOPHIL # BLD: 0.2 K/UL (ref 0–0.7)
EOSINOPHIL NFR BLD: 2 %
ERYTHROCYTE [DISTWIDTH] IN BLOOD BY AUTOMATED COUNT: 14.1 % (ref 11–15)
GLUCOSE SERPL-MCNC: 138 MG/DL (ref 70–99)
HCT VFR BLD AUTO: 37.5 % (ref 41–52)
HGB BLD-MCNC: 12.5 G/DL (ref 13.5–17.5)
LACTATE SERPL-SCNC: 1.1 MMOL/L (ref 0.5–2.2)
LYMPHOCYTES # BLD: 2.1 K/UL (ref 1–4)
LYMPHOCYTES NFR BLD: 22 %
MCH RBC QN AUTO: 30.5 PG (ref 27–32)
MCHC RBC AUTO-ENTMCNC: 33.3 G/DL (ref 32–37)
MCV RBC AUTO: 91.5 FL (ref 80–100)
MONOCYTES # BLD: 0.9 K/UL (ref 0–1)
MONOCYTES NFR BLD: 10 %
NEUTROPHILS # BLD AUTO: 6 K/UL (ref 1.8–7.7)
NEUTROPHILS NFR BLD: 66 %
OSMOLALITY UR CALC.SUM OF ELEC: 295 MOSM/KG (ref 275–295)
PLATELET # BLD AUTO: 214 K/UL (ref 140–400)
PMV BLD AUTO: 9 FL (ref 7.4–10.3)
POTASSIUM SERPL-SCNC: 3.9 MMOL/L (ref 3.3–5.1)
POTASSIUM SERPL-SCNC: 3.9 MMOL/L (ref 3.3–5.1)
RBC # BLD AUTO: 4.1 M/UL (ref 4.5–5.9)
SODIUM SERPL-SCNC: 142 MMOL/L (ref 136–144)
WBC # BLD AUTO: 9.1 K/UL (ref 4–11)

## 2018-01-03 PROCEDURE — 99233 SBSQ HOSP IP/OBS HIGH 50: CPT | Performed by: HOSPITALIST

## 2018-01-03 RX ORDER — HYDRALAZINE HYDROCHLORIDE 20 MG/ML
10 INJECTION INTRAMUSCULAR; INTRAVENOUS EVERY 6 HOURS PRN
Status: DISCONTINUED | OUTPATIENT
Start: 2018-01-03 | End: 2018-01-04

## 2018-01-03 RX ORDER — 0.9 % SODIUM CHLORIDE 0.9 %
VIAL (ML) INJECTION
Status: COMPLETED
Start: 2018-01-03 | End: 2018-01-03

## 2018-01-03 RX ORDER — LOSARTAN POTASSIUM 100 MG/1
100 TABLET ORAL DAILY
Status: DISCONTINUED | OUTPATIENT
Start: 2018-01-03 | End: 2018-01-04

## 2018-01-03 NOTE — PLAN OF CARE
Chin and lip edema continues. Skin chin and lip pink. No drainage noted. Ancef iv, ID consult.   Continue to monitor skin, labs, and vs.

## 2018-01-03 NOTE — PROGRESS NOTES
[facility] dosing service    Initial Pharmacokinetic Consult for Vancomycin Dosing     Marc Mosquera is a 65year old male admitted on 1/2 who is being treated for cellulitis. Pharmacy has been asked to dose Vancomycin. He has No Known Allergies. PharmD  1/2/2018  8:38 PM  [address]: [phone]

## 2018-01-03 NOTE — PLAN OF CARE
Problem: Patient/Family Goals  Goal: Patient/Family Long Term Goal  Patient's Long Term Goal: To go home    Interventions:  - See additional Care Plan goals for specific interventions   Outcome: Progressing    Goal: Patient/Family Short Term Goal  Patient'

## 2018-01-03 NOTE — PROGRESS NOTES
[facility]  Progress Note     Justynkrysta Pop  : 3/17/1960    Status: Inpatient  Day #: 1    Attending: Yosef Ribera MD  PCP: Jeff Chan MD      Assessment and Plan     Left-sided facial cellulitis  - Cont empiric Ancef.    - No absc 0 ml   Net             1202 ml       Patient Weight for the past 72 hrs:   Weight   01/02/18 1546 217 lb (98.4 kg)         Body mass index is 32.05 kg/m².     Recent Labs   Lab  01/02/18   1636  01/03/18   0510   WBC  10.8  9.1   HGB  13.8  12.5*   H

## 2018-01-03 NOTE — TELEPHONE ENCOUNTER
Pt called in requesting a refill for the following medications:  Pt canceled his appt for 1/4 as he was admitted into the hospital for a skin infection. Pt has appt scheduled for 2/22.

## 2018-01-03 NOTE — CONSULTS
INFECTIOUS DISEASE CONSULT NOTE    Janna Lai Patient Status:  Inpatient    3/17/1960 MRN [id number]   Location [facility] 5SW/SE Attending Von Baum MD   Hosp Day # 1 PCP Mouna Meadows Intravenous, Q6H PRN  •  aspirin EC tab 81 mg, 81 mg, Oral, Daily  •  atorvastatin (LIPITOR) tab 40 mg, 40 mg, Oral, Nightly  •  carvedilol (COREG) tab 3.125 mg, 3.125 mg, Oral, BID with meals  •  DilTIAZem HCl ER Beads (TIAZAC) 24 hr cap 360 mg, 360 mg, O Soft, nontender, nondistended.    Musculoskeletal: No edema noted  Integument: L chin fluctuant area with purulent drinage with erythema and ttp; L cervical LAD noted    Laboratory Data:    Recent Labs   Lab  01/03/18   0510   RBC  4.10*   HGB  12.5*   HCT Janak  1/3/2018

## 2018-01-03 NOTE — H&P
[address] Patient Status:  Inpatient    3/17/1960 MRN [id number]   Location [facility] 5SW/SE Attending Cristin Hayden MD   Hosp Day # 0 PCP Vida Mccauley MD     Date:  2018  Date of TURMERIC OR 1/2/2018 at 1300  Yes Yes   Sig: Take by mouth. aspirin 81 MG Oral Tab 12/30/2017 at Unknown time  Yes Yes   Sig: Take 81 mg by mouth daily.    atorvastatin 40 MG Oral Tab 1/1/2018 at 2200  No Yes   Sig: Take 1 tablet (40 mg total) by mouth Speech:  Oriented, speech clear and coherent. Psychiatric:  Cooperative, appropriate mood & affect.       Laboratory Data:     Lab Results  Component Value Date   WBC 10.8 01/02/2018   HGB 13.8 01/02/2018   HCT 41.1 01/02/2018    01/02/2018   CREATS

## 2018-01-04 VITALS
RESPIRATION RATE: 18 BRPM | WEIGHT: 217 LBS | HEIGHT: 69 IN | SYSTOLIC BLOOD PRESSURE: 144 MMHG | HEART RATE: 81 BPM | TEMPERATURE: 98 F | BODY MASS INDEX: 32.14 KG/M2 | DIASTOLIC BLOOD PRESSURE: 82 MMHG | OXYGEN SATURATION: 96 %

## 2018-01-04 LAB
ANION GAP SERPL CALC-SCNC: 7 MMOL/L (ref 0–18)
BUN SERPL-MCNC: 16 MG/DL (ref 8–20)
BUN/CREAT SERPL: 13.6 (ref 10–20)
CALCIUM SERPL-MCNC: 9.6 MG/DL (ref 8.5–10.5)
CHLORIDE SERPL-SCNC: 104 MMOL/L (ref 95–110)
CO2 SERPL-SCNC: 26 MMOL/L (ref 22–32)
CREAT SERPL-MCNC: 1.18 MG/DL (ref 0.5–1.5)
ERYTHROCYTE [DISTWIDTH] IN BLOOD BY AUTOMATED COUNT: 14.3 % (ref 11–15)
GLUCOSE BLDC GLUCOMTR-MCNC: 205 MG/DL (ref 70–99)
GLUCOSE BLDC GLUCOMTR-MCNC: 239 MG/DL (ref 70–99)
GLUCOSE BLDC GLUCOMTR-MCNC: 354 MG/DL (ref 70–99)
GLUCOSE SERPL-MCNC: 229 MG/DL (ref 70–99)
HBA1C MFR BLD: 5.6 % (ref 4–6)
HCT VFR BLD AUTO: 38.8 % (ref 41–52)
HGB BLD-MCNC: 13.2 G/DL (ref 13.5–17.5)
MCH RBC QN AUTO: 30.6 PG (ref 27–32)
MCHC RBC AUTO-ENTMCNC: 33.9 G/DL (ref 32–37)
MCV RBC AUTO: 90.1 FL (ref 80–100)
OSMOLALITY UR CALC.SUM OF ELEC: 292 MOSM/KG (ref 275–295)
PLATELET # BLD AUTO: 222 K/UL (ref 140–400)
PMV BLD AUTO: 8.4 FL (ref 7.4–10.3)
POTASSIUM SERPL-SCNC: 4.2 MMOL/L (ref 3.3–5.1)
RBC # BLD AUTO: 4.31 M/UL (ref 4.5–5.9)
SODIUM SERPL-SCNC: 137 MMOL/L (ref 136–144)
WBC # BLD AUTO: 7.9 K/UL (ref 4–11)

## 2018-01-04 PROCEDURE — 99239 HOSP IP/OBS DSCHRG MGMT >30: CPT | Performed by: HOSPITALIST

## 2018-01-04 PROCEDURE — 99221 1ST HOSP IP/OBS SF/LOW 40: CPT | Performed by: OTOLARYNGOLOGY

## 2018-01-04 RX ORDER — DEXTROSE MONOHYDRATE 25 G/50ML
50 INJECTION, SOLUTION INTRAVENOUS AS NEEDED
Status: DISCONTINUED | OUTPATIENT
Start: 2018-01-04 | End: 2018-01-04

## 2018-01-04 RX ORDER — CARVEDILOL 6.25 MG/1
6.25 TABLET ORAL 2 TIMES DAILY WITH MEALS
Status: DISCONTINUED | OUTPATIENT
Start: 2018-01-04 | End: 2018-01-04

## 2018-01-04 RX ORDER — SULFAMETHOXAZOLE AND TRIMETHOPRIM 800; 160 MG/1; MG/1
1 TABLET ORAL 2 TIMES DAILY
Qty: 20 TABLET | Refills: 0 | Status: SHIPPED | OUTPATIENT
Start: 2018-01-04 | End: 2018-01-14

## 2018-01-04 RX ORDER — SODIUM CHLORIDE 9 MG/ML
INJECTION, SOLUTION INTRAVENOUS
Status: COMPLETED
Start: 2018-01-04 | End: 2018-01-04

## 2018-01-04 NOTE — PROGRESS NOTES
[facility] HOSP - [facility]  Progress Note     Tonyjuanjonathan Lemos  : 3/17/1960    Status: Inpatient  Day #: 2    Attending: Katie Lo MD  PCP: Reva Dorado MD      Assessment and Plan     L chin abscess and cellulitis   - Cx + MRSA.    - Change Ancef t focal neurological deficits. Musculoskeletal: Moves all extremities. Extremities: No edema.       Intake/Output Summary (Last 24 hours) at 01/04/18 0925  Last data filed at 01/04/18 0700   Gross per 24 hour   Intake              880 ml   Output DilTIAZem HCl ER Beads  360 mg Oral Daily   • Potassium Chloride ER  40 mEq Oral Daily      PRN Meds: dextrose 50%, Glucose-Vitamin C, hydrALAzine HCl, ondansetron HCl, acetaminophen        Dakotah Guzman MD

## 2018-01-04 NOTE — DISCHARGE SUMMARY
[facility] HOSPITALIST  DISCHARGE SUMMARY     James Cameron Patient Status:  Inpatient    3/17/1960 MRN [id number]   Location [facility] 5SW/SE Attending Irish Jain MD   Hosp Day # 2 PCP Shanika Lorenz MD     Date of Admission: 2018  Date Physician  INFECTIOUS DISEASES      •       Discharge Medication List:     Discharge Medications      CONTINUE taking these medications      Instructions Prescription details   aspirin 81 MG Tabs      Take 81 mg by mouth daily.    Refills:  0     atorvastat edema.  -----------------------------------------------------------------------------------------------  PATIENT DISCHARGE INSTRUCTIONS:       Other Discharge Instructions:       FU with PCP in 1 week              Hospital Discharge Diagnoses: L chin cellu

## 2018-01-04 NOTE — TELEPHONE ENCOUNTER
LMTCB please transfer to triage RN  What are the prescriptions that need to be refilled?  The patient is currently hospitalized

## 2018-01-04 NOTE — PROGRESS NOTES
INFECTIOUS DISEASE PROGRESS NOTE    Janit Romberg Patient Status:  Inpatient    3/17/1960 MRN [id number]   Location [facility] 5SW/SE Attending Arelis Longoria MD   Hosp Day # 2 PCP Araceli Lema MD     Subjective:  Afebrile.  Much improved L ch 10.8.  Pro calcitonin negative. Lactic acid normal.  Given vancomycin then changed to cefazolin. Aerobic culture from the wound with staph aureus and blood cultures pending.   CT of the facial bones with perioral soft tissue inflammation slightly asymmetr

## 2018-01-04 NOTE — CONSULTS
[facility]    Report of Consultation    Lazaro Quiroz Patient Status:  Inpatient    3/17/1960 MRN [id number]   Location [facility] 5SW/SE Attending Samaria Vasquez MD   Hosp Day # 2 PCP Bhavana Hernadez MD     Date of Admission:  1 Units 1-5 Units Subcutaneous 4 times per day   losartan (COZAAR) tab 100 mg 100 mg Oral Daily   hydrALAzine HCl (APRESOLINE) injection 10 mg 10 mg Intravenous Q6H PRN   dexamethasone Sodium Phosphate (DECADRON) 8 mg in sodium chloride 0.9 % 50 mL IVPB 8 mg Cranial nerves II through XII grossly intact.    Head/Face abNormal Facial features - Normal. Eyebrows - Normal. Skull - Normal.indurated swelling around an area of broken skin no fluctuance noted         Nasopharynx Normal External nose - Normal. Lips/teet fracture. Patient is partially edentulous. Several dental caries are present mainly in the left maxillary teeth. NASAL CAVITY: Slight rightward nasal septal deviation. SALIVARY GLANDS: The parotid and submandibular glands are unremarkable.   UPPER AIRWAY:

## 2018-01-04 NOTE — PLAN OF CARE
Problem: Patient/Family Goals  Goal: Patient/Family Long Term Goal  Patient's Long Term Goal: To go home    Interventions:  - See additional Care Plan goals for specific interventions    Outcome: Progressing    Goal: Patient/Family Short Term Goal  Patient on pain and pain management  - Manage/alleviate anxiety  - Utilize distraction and/or relaxation techniques  - Monitor for opioid side effects  - Notify MD/LIP if interventions unsuccessful or patient reports new pain  - Anticipate increased pain with acti

## 2018-01-04 NOTE — PROGRESS NOTES
[facility] dosing service    Initial Pharmacokinetic Consult for Vancomycin Dosing     Cheryl Ellsworth is a 65year old male admitted on 1/2 who is being treated for facial cellulitis/+ve MRSA . Pharmacy has been asked to dose Vancomycin by Dr. Derick Shelton. There is joey-oral soft tissue inflammation slightly asymmetric to the left half of the region extending into the left face. This finding is nonspecific and could represent sequela of trauma, angioedema, or cellulitis.   There is scattered dental caries pagan

## 2018-01-05 RX ORDER — LOSARTAN POTASSIUM AND HYDROCHLOROTHIAZIDE 25; 100 MG/1; MG/1
1 TABLET ORAL DAILY
Qty: 90 TABLET | Refills: 0 | Status: SHIPPED | OUTPATIENT
Start: 2018-01-05 | End: 2018-05-07

## 2018-01-05 RX ORDER — CARVEDILOL 3.12 MG/1
3.12 TABLET ORAL 2 TIMES DAILY WITH MEALS
Qty: 180 TABLET | Refills: 0 | Status: SHIPPED | OUTPATIENT
Start: 2018-01-05 | End: 2018-05-07

## 2018-01-05 RX ORDER — ATORVASTATIN CALCIUM 40 MG/1
40 TABLET, FILM COATED ORAL NIGHTLY
Qty: 90 TABLET | Refills: 0 | Status: SHIPPED | OUTPATIENT
Start: 2018-01-05 | End: 2018-04-17

## 2018-01-05 NOTE — TELEPHONE ENCOUNTER
Hypertensive Medications  Protocol Criteria:  · Appointment scheduled in the past 6 months or in the next 3 months  · BMP or CMP in the past 12 months  · Creatinine result < 2  Recent Outpatient Visits            3 days ago Acute abscess of face    [place] months ago Essential hypertension    [place] [facility] Kati Barlow MD    Office Visit    3 months ago Essential hypertension    [facility], [address], [place]    Nurse Only    3 months ago Essential hypertension    [facility]

## 2018-01-22 ENCOUNTER — OFFICE VISIT (OUTPATIENT)
Dept: INTERNAL MEDICINE CLINIC | Facility: CLINIC | Age: 58
End: 2018-01-22

## 2018-01-22 VITALS
HEART RATE: 64 BPM | RESPIRATION RATE: 16 BRPM | DIASTOLIC BLOOD PRESSURE: 98 MMHG | HEIGHT: 69 IN | WEIGHT: 217 LBS | SYSTOLIC BLOOD PRESSURE: 130 MMHG | BODY MASS INDEX: 32.14 KG/M2

## 2018-01-22 DIAGNOSIS — L03.211 FACIAL CELLULITIS: ICD-10-CM

## 2018-01-22 DIAGNOSIS — E78.2 MIXED HYPERLIPIDEMIA: Primary | ICD-10-CM

## 2018-01-22 DIAGNOSIS — I10 ESSENTIAL HYPERTENSION: ICD-10-CM

## 2018-01-22 DIAGNOSIS — E87.6 HYPOKALEMIA: ICD-10-CM

## 2018-01-22 PROCEDURE — 99214 OFFICE O/P EST MOD 30 MIN: CPT | Performed by: INTERNAL MEDICINE

## 2018-01-22 PROCEDURE — 99212 OFFICE O/P EST SF 10 MIN: CPT | Performed by: INTERNAL MEDICINE

## 2018-01-22 RX ORDER — SULFAMETHOXAZOLE AND TRIMETHOPRIM 800; 160 MG/1; MG/1
1 TABLET ORAL 2 TIMES DAILY
Qty: 20 TABLET | Refills: 0 | Status: SHIPPED | OUTPATIENT
Start: 2018-01-22 | End: 2018-02-05 | Stop reason: ALTCHOICE

## 2018-01-22 NOTE — PROGRESS NOTES
HPI:    Patient ID: Howard De Leon is a 65year old male.     Date of Admission: 1/2/2018  Date of Discharge:  1/4/2018  Discharge Disposition: home   Admitting Chief Complaint:   Hypokalemia (E87.6)  Facial cellulitis (S96.519)     Discharge Diagnosis:   L erythema. ). Pertinent negatives include no chest pain, headaches, sore throat or swollen glands. Treatments tried: bactrim ds for MRSA. The treatment provided moderate relief. Hypertension   This is a chronic problem.  The current episode started more mario alberto tablet Rfl: 0   DilTIAZem HCl ER Beads 360 MG Oral Capsule SR 24 Hr Take 1 capsule (360 mg total) by mouth daily. Disp: 30 capsule Rfl: 3   Magnesium 500 MG Oral Tab Take by mouth. Disp:  Rfl:    COENZYME Q10 OR Take by mouth.  Disp:  Rfl:    aspirin 81 MG : 217 lb 4.8 oz (98.6 kg)  10/31/17 : 219 lb (99.3 kg)  10/18/17 : 221 lb (100.2 kg)  09/15/17 : 223 lb (101.2 kg)  Patient has been on strict diet control, has reduced salt intake as well as reduce his intake of cured meats like sausage, fowler, hamburgers This Encounter      CBC W Differential W Platelet [E]    Meds This Visit:  Signed Prescriptions Disp Refills    Sulfamethoxazole-TMP DS (BACTRIM DS) 800-160 MG Oral Tab per tablet 20 tablet 0      Sig: Take 1 tablet by mouth 2 (two) times daily.

## 2018-01-22 NOTE — ASSESSMENT & PLAN NOTE
Blood pressure 130/98, pulse 64, resp. rate 16, height 5' 9\" (1.753 m), weight 217 lb (98.4 kg).      Wt Readings from Last 6 Encounters:  01/22/18 : 217 lb (98.4 kg)  01/02/18 : 217 lb (98.4 kg)  01/02/18 : 217 lb 4.8 oz (98.6 kg)  10/31/17 : 219 lb (99.3

## 2018-01-22 NOTE — ASSESSMENT & PLAN NOTE
Patient was recently treated for MRSA facial cellulitis. The abscess on his left chin drained on its own and did not need further surgical drainage.   On examination today he does have persistent induration with mild tenderness and hyperpigmentation of the

## 2018-01-22 NOTE — ASSESSMENT & PLAN NOTE
Patient has been on atorvastatin at 40 mg 1 tablet once daily. He does have labs ordered already for recheck which are pending. He is advised to complete these prior to the next office visit in 2 weeks.

## 2018-01-22 NOTE — ASSESSMENT & PLAN NOTE
Potassium levels were low when in the hospital, this was supplemented. He is due for recheck labs–he has orders standing already. He is advised to have these completed and follow-up in the next few weeks.

## 2018-01-22 NOTE — PATIENT INSTRUCTIONS
Problem List Items Addressed This Visit        Unprioritized    Essential hypertension     Blood pressure 130/98, pulse 64, resp. rate 16, height 5' 9\" (1.753 m), weight 217 lb (98.4 kg).      Wt Readings from Last 6 Encounters:  01/22/18 : 217 lb (98.4 kg tablet once daily. He does have labs ordered already for recheck which are pending. He is advised to complete these prior to the next office visit in 2 weeks.

## 2018-01-30 ENCOUNTER — LAB ENCOUNTER (OUTPATIENT)
Dept: LAB | Facility: HOSPITAL | Age: 58
End: 2018-01-30
Attending: INTERNAL MEDICINE
Payer: COMMERCIAL

## 2018-01-30 DIAGNOSIS — L03.211 FACIAL CELLULITIS: ICD-10-CM

## 2018-01-30 DIAGNOSIS — I10 ESSENTIAL HYPERTENSION: ICD-10-CM

## 2018-01-30 DIAGNOSIS — E78.2 MIXED HYPERLIPIDEMIA: ICD-10-CM

## 2018-01-30 LAB
ALBUMIN SERPL BCP-MCNC: 4.1 G/DL (ref 3.5–4.8)
ALBUMIN/GLOB SERPL: 1.5 {RATIO} (ref 1–2)
ALP SERPL-CCNC: 75 U/L (ref 32–100)
ALT SERPL-CCNC: 21 U/L (ref 17–63)
ANION GAP SERPL CALC-SCNC: 7 MMOL/L (ref 0–18)
AST SERPL-CCNC: 23 U/L (ref 15–41)
BASOPHILS # BLD: 0 K/UL (ref 0–0.2)
BASOPHILS NFR BLD: 1 %
BILIRUB SERPL-MCNC: 0.6 MG/DL (ref 0.3–1.2)
BILIRUB UR QL: NEGATIVE
BUN SERPL-MCNC: 15 MG/DL (ref 8–20)
BUN/CREAT SERPL: 9.4 (ref 10–20)
CALCIUM SERPL-MCNC: 9.1 MG/DL (ref 8.5–10.5)
CHLORIDE SERPL-SCNC: 103 MMOL/L (ref 95–110)
CHOLEST SERPL-MCNC: 144 MG/DL (ref 110–200)
CLARITY UR: CLEAR
CO2 SERPL-SCNC: 27 MMOL/L (ref 22–32)
COLOR UR: YELLOW
CREAT SERPL-MCNC: 1.6 MG/DL (ref 0.5–1.5)
EOSINOPHIL # BLD: 0.1 K/UL (ref 0–0.7)
EOSINOPHIL NFR BLD: 1 %
ERYTHROCYTE [DISTWIDTH] IN BLOOD BY AUTOMATED COUNT: 14.8 % (ref 11–15)
GLOBULIN PLAS-MCNC: 2.8 G/DL (ref 2.5–3.7)
GLUCOSE SERPL-MCNC: 146 MG/DL (ref 70–99)
GLUCOSE UR-MCNC: NEGATIVE MG/DL
HCT VFR BLD AUTO: 35.3 % (ref 41–52)
HDLC SERPL-MCNC: 29 MG/DL
HGB BLD-MCNC: 12 G/DL (ref 13.5–17.5)
HGB UR QL STRIP.AUTO: NEGATIVE
KETONES UR-MCNC: NEGATIVE MG/DL
LDLC SERPL CALC-MCNC: 102 MG/DL (ref 0–99)
LEUKOCYTE ESTERASE UR QL STRIP.AUTO: NEGATIVE
LYMPHOCYTES # BLD: 1.6 K/UL (ref 1–4)
LYMPHOCYTES NFR BLD: 39 %
MCH RBC QN AUTO: 31 PG (ref 27–32)
MCHC RBC AUTO-ENTMCNC: 34 G/DL (ref 32–37)
MCV RBC AUTO: 91.4 FL (ref 80–100)
MONOCYTES # BLD: 0.5 K/UL (ref 0–1)
MONOCYTES NFR BLD: 13 %
NEUTROPHILS # BLD AUTO: 2 K/UL (ref 1.8–7.7)
NEUTROPHILS NFR BLD: 46 %
NITRITE UR QL STRIP.AUTO: NEGATIVE
NONHDLC SERPL-MCNC: 115 MG/DL
OSMOLALITY UR CALC.SUM OF ELEC: 287 MOSM/KG (ref 275–295)
PH UR: 6 [PH] (ref 5–8)
PLATELET # BLD AUTO: 210 K/UL (ref 140–400)
PMV BLD AUTO: 8.3 FL (ref 7.4–10.3)
POTASSIUM SERPL-SCNC: 3.8 MMOL/L (ref 3.3–5.1)
PROT SERPL-MCNC: 6.9 G/DL (ref 5.9–8.4)
PROT UR-MCNC: NEGATIVE MG/DL
RBC # BLD AUTO: 3.87 M/UL (ref 4.5–5.9)
SODIUM SERPL-SCNC: 137 MMOL/L (ref 136–144)
SP GR UR STRIP: 1.01 (ref 1–1.03)
TRIGL SERPL-MCNC: 63 MG/DL (ref 1–149)
UROBILINOGEN UR STRIP-ACNC: <2
VIT C UR-MCNC: NEGATIVE MG/DL
WBC # BLD AUTO: 4.2 K/UL (ref 4–11)

## 2018-01-30 PROCEDURE — 80061 LIPID PANEL: CPT

## 2018-01-30 PROCEDURE — 85025 COMPLETE CBC W/AUTO DIFF WBC: CPT

## 2018-01-30 PROCEDURE — 36415 COLL VENOUS BLD VENIPUNCTURE: CPT

## 2018-01-30 PROCEDURE — 80053 COMPREHEN METABOLIC PANEL: CPT

## 2018-01-30 PROCEDURE — 81003 URINALYSIS AUTO W/O SCOPE: CPT

## 2018-02-05 ENCOUNTER — OFFICE VISIT (OUTPATIENT)
Dept: INTERNAL MEDICINE CLINIC | Facility: CLINIC | Age: 58
End: 2018-02-05

## 2018-02-05 VITALS
WEIGHT: 217 LBS | HEART RATE: 63 BPM | BODY MASS INDEX: 32.14 KG/M2 | HEIGHT: 69 IN | DIASTOLIC BLOOD PRESSURE: 100 MMHG | SYSTOLIC BLOOD PRESSURE: 140 MMHG

## 2018-02-05 DIAGNOSIS — I10 ESSENTIAL HYPERTENSION: Primary | ICD-10-CM

## 2018-02-05 DIAGNOSIS — E78.2 MIXED HYPERLIPIDEMIA: ICD-10-CM

## 2018-02-05 DIAGNOSIS — L03.211 FACIAL CELLULITIS: ICD-10-CM

## 2018-02-05 PROCEDURE — 99212 OFFICE O/P EST SF 10 MIN: CPT | Performed by: INTERNAL MEDICINE

## 2018-02-05 PROCEDURE — 99214 OFFICE O/P EST MOD 30 MIN: CPT | Performed by: INTERNAL MEDICINE

## 2018-02-05 RX ORDER — CLINDAMYCIN HYDROCHLORIDE 300 MG/1
CAPSULE ORAL
Qty: 20 CAPSULE | Refills: 0 | Status: SHIPPED | OUTPATIENT
Start: 2018-02-05

## 2018-02-05 RX ORDER — DILTIAZEM HYDROCHLORIDE 360 MG/1
360 CAPSULE, EXTENDED RELEASE ORAL DAILY
Qty: 90 CAPSULE | Refills: 1 | Status: SHIPPED | OUTPATIENT
Start: 2018-02-05 | End: 2018-09-21

## 2018-02-05 NOTE — PATIENT INSTRUCTIONS
Problem List Items Addressed This Visit        Unprioritized    Essential hypertension - Primary     Blood pressure 140/100, pulse 63, height 5' 9\" (1.753 m), weight 217 lb (98.4 kg).      Patient has not been taking his medications on a regular basis as n - INTERNAL    Mixed hyperlipidemia     Patient has been on atorvastatin at 40 mg 1 tablet once daily which he has tolerated well. Liver function tests and lipid panel shows improvement. Will advised to continue the same dose of medications.

## 2018-02-05 NOTE — ASSESSMENT & PLAN NOTE
Patient has been on atorvastatin at 40 mg 1 tablet once daily which he has tolerated well. Liver function tests and lipid panel shows improvement. Will advised to continue the same dose of medications.

## 2018-02-05 NOTE — PROGRESS NOTES
HPI:    Patient ID: Rajeev Knihgt is a 65year old male. Hyperlipidemia   This is a chronic problem. The current episode started more than 1 year ago. The problem is controlled. Recent lipid tests were reviewed and are variable.  Exacerbating diseases i or swollen glands. Treatments tried: bactrim ds for MRSA. The treatment provided moderate relief. Review of Systems   Constitutional: Negative. Negative for malaise/fatigue. HENT: Negative. Negative for sore throat. Eyes: Negative.     Respirat Cardiovascular: Normal rate, regular rhythm, normal heart sounds and intact distal pulses. Pulmonary/Chest: Effort normal and breath sounds normal.   Abdominal: Soft. Bowel sounds are normal.   Musculoskeletal: Normal range of motion.    Lymphadenopath Beads 360 MG Oral Capsule SR 24 Hr    Other Relevant Orders    COMP METABOLIC PANEL (14)    Facial cellulitis     Patient has been treated for MRSA facial cellulitis.   He was restarted on Bactrim DS at his last office visit and he is completed his dose rec

## 2018-02-05 NOTE — ASSESSMENT & PLAN NOTE
Patient has been treated for MRSA facial cellulitis. He was restarted on Bactrim DS at his last office visit and he is completed his dose recently. He continues to have 2 areas of induration and possible developing abscesses under.   Not at this stage to

## 2018-02-05 NOTE — ASSESSMENT & PLAN NOTE
Blood pressure 140/100, pulse 63, height 5' 9\" (1.753 m), weight 217 lb (98.4 kg). Patient has not been taking his medications on a regular basis as needed. He is supposed to take a Coreg at bedtime with his diltiazem which he has not taken.   Additio

## 2018-02-19 ENCOUNTER — NURSE ONLY (OUTPATIENT)
Dept: INTERNAL MEDICINE CLINIC | Facility: CLINIC | Age: 58
End: 2018-02-19

## 2018-02-19 VITALS — DIASTOLIC BLOOD PRESSURE: 94 MMHG | SYSTOLIC BLOOD PRESSURE: 153 MMHG | HEART RATE: 74 BPM

## 2018-02-19 DIAGNOSIS — I15.9 SECONDARY HYPERTENSION: Primary | ICD-10-CM

## 2018-02-19 NOTE — PROGRESS NOTES
Dr. Quita Armenta pt came in for blood pressure check. 163/103 and 153/94. Also pt was asking for his lab results from 01/30/17. Dr. Quita Armenta please advise.

## 2018-02-20 NOTE — PROGRESS NOTES
nutrsing was adv to call this pt and adv to come back for recheck-I dont see documentation indicating it was done. I called pt and left a message. please check with pt if he understoon=d need for f/u nurse visit in 1 week

## 2018-02-26 ENCOUNTER — NURSE ONLY (OUTPATIENT)
Dept: INTERNAL MEDICINE CLINIC | Facility: CLINIC | Age: 58
End: 2018-02-26

## 2018-02-26 VITALS — HEART RATE: 55 BPM | SYSTOLIC BLOOD PRESSURE: 130 MMHG | DIASTOLIC BLOOD PRESSURE: 90 MMHG

## 2018-02-26 DIAGNOSIS — I10 HYPERTENSION, UNSPECIFIED TYPE: Primary | ICD-10-CM

## 2018-02-26 RX ORDER — TAMSULOSIN HYDROCHLORIDE 0.4 MG/1
0.4 CAPSULE ORAL DAILY
Qty: 30 CAPSULE | Refills: 2 | Status: SHIPPED | OUTPATIENT
Start: 2018-02-26 | End: 2018-03-28

## 2018-02-27 ENCOUNTER — TELEPHONE (OUTPATIENT)
Dept: INTERNAL MEDICINE CLINIC | Facility: CLINIC | Age: 58
End: 2018-02-27

## 2018-02-27 NOTE — PROGRESS NOTES
HPI:    Patient ID: Alfie Johnson is a 65year old male. HPI    Review of Systems         Current Outpatient Prescriptions:  tamsulosin HCl 0.4 MG Oral Cap Take 1 capsule (0.4 mg total) by mouth daily.  Disp: 30 capsule Rfl: 2   DilTIAZem HCl ER Beads 3

## 2018-02-27 NOTE — TELEPHONE ENCOUNTER
See 2/19/18 nurse visit encounter in which Dr Jonathon Mendez directed nursing staff to call patient to schedule a f/u nurse visit for BP check  lmtcb on patient VM to call back and make appt for nurse visit, BP check this week

## 2018-03-01 NOTE — TELEPHONE ENCOUNTER
LMTCB, please transfer to RN triage. Dr. Deidra Calderon, we have tried to reach this pt x3 without success. No response letter was sent via [employer] and mailed to home address on file. CSS, please call and assist pt in scheduling f/u appt. Thank you.

## 2018-04-18 RX ORDER — ATORVASTATIN CALCIUM 40 MG/1
TABLET, FILM COATED ORAL
Qty: 90 TABLET | Refills: 1 | Status: SHIPPED | OUTPATIENT
Start: 2018-04-18

## 2018-05-10 RX ORDER — LOSARTAN POTASSIUM AND HYDROCHLOROTHIAZIDE 25; 100 MG/1; MG/1
1 TABLET ORAL DAILY
Qty: 90 TABLET | Refills: 1 | Status: SHIPPED | OUTPATIENT
Start: 2018-05-10 | End: 2019-05-05

## 2018-05-10 RX ORDER — CARVEDILOL 3.12 MG/1
TABLET ORAL
Qty: 180 TABLET | Refills: 1 | Status: SHIPPED | OUTPATIENT
Start: 2018-05-10 | End: 2019-10-07

## 2018-09-21 DIAGNOSIS — I10 ESSENTIAL HYPERTENSION: ICD-10-CM

## 2018-09-22 RX ORDER — DILTIAZEM HYDROCHLORIDE 360 MG/1
CAPSULE, EXTENDED RELEASE ORAL
Qty: 90 CAPSULE | Refills: 1 | Status: SHIPPED | OUTPATIENT
Start: 2018-09-22 | End: 2019-10-07

## 2019-08-19 ENCOUNTER — TELEPHONE (OUTPATIENT)
Dept: CASE MANAGEMENT | Age: 59
End: 2019-08-19

## 2019-10-01 ENCOUNTER — TELEPHONE (OUTPATIENT)
Dept: CASE MANAGEMENT | Age: 59
End: 2019-10-01

## 2019-10-07 DIAGNOSIS — I10 ESSENTIAL HYPERTENSION: ICD-10-CM

## 2019-10-09 DIAGNOSIS — I10 ESSENTIAL HYPERTENSION: ICD-10-CM

## 2019-10-09 RX ORDER — DILTIAZEM HYDROCHLORIDE 360 MG/1
360 CAPSULE, EXTENDED RELEASE ORAL DAILY
Qty: 30 CAPSULE | Refills: 0 | Status: SHIPPED | OUTPATIENT
Start: 2019-10-09

## 2019-10-09 RX ORDER — CARVEDILOL 3.12 MG/1
3.12 TABLET ORAL 2 TIMES DAILY WITH MEALS
Qty: 60 TABLET | Refills: 0 | Status: SHIPPED | OUTPATIENT
Start: 2019-10-09

## 2019-10-11 RX ORDER — DILTIAZEM HYDROCHLORIDE 360 MG/1
CAPSULE, EXTENDED RELEASE ORAL
Qty: 90 CAPSULE | Refills: 1 | OUTPATIENT
Start: 2019-10-11

## 2019-10-24 ENCOUNTER — TELEPHONE (OUTPATIENT)
Dept: CASE MANAGEMENT | Age: 59
End: 2019-10-24

## (undated) NOTE — LETTER
3/1/2018              Markie Lopez        [address]         Dear Miguel Angel Salomon,    This letter is to inform you that our office has made several attempts to reach you by phone without success.   We were attempting to contact

## (undated) NOTE — LETTER
06/01/18        Ale Choi  [address] [facility] [address]      Dear Nunu Boswell,    [address] [facility] records indicate that you have outstanding lab work and or testing that was ordered for you and has not yet been completed:          Comp Metabolic Panel (14

## (undated) NOTE — LETTER
08/01/18        Janna Lai  [address] [facility] [place] [address]      Dear Ivana Metzger,    [address] [facility] records indicate that you have outstanding lab work and or testing that was ordered for you and has not yet been completed:          Comp Metabolic Panel (14

## (undated) NOTE — LETTER
09/05/18        Howard De Leon  [address] [facility] [address]      Dear Marc [facility],    [address] [facility] records indicate that you have outstanding lab work and or testing that was ordered for you and has not yet been completed:          Comp Metabolic Panel (14

## (undated) NOTE — LETTER
10/09/18        Jocelyne Moctezuma  [address] [facility] [address]      Dear Jenny Morocho,    [address] [facility] records indicate that you have outstanding lab work and or testing that was ordered for you and has not yet been completed:  Orders Placed This Encounter

## (undated) NOTE — LETTER
03/19/18        Wendy Candelario  [address] [facility] [place] [address]      Dear Jerona Prader,    [address] [facility] records indicate that you have outstanding lab work and or testing that was ordered for you and has not yet been completed:          Comp Metabolic Panel (14